# Patient Record
Sex: FEMALE | Race: BLACK OR AFRICAN AMERICAN | NOT HISPANIC OR LATINO | Employment: FULL TIME | URBAN - METROPOLITAN AREA
[De-identification: names, ages, dates, MRNs, and addresses within clinical notes are randomized per-mention and may not be internally consistent; named-entity substitution may affect disease eponyms.]

---

## 2023-10-05 ENCOUNTER — OFFICE VISIT (OUTPATIENT)
Dept: DERMATOLOGY | Facility: CLINIC | Age: 23
End: 2023-10-05
Payer: COMMERCIAL

## 2023-10-05 DIAGNOSIS — L81.9 DYSCHROMIA: ICD-10-CM

## 2023-10-05 DIAGNOSIS — L70.0 ACNE VULGARIS: Primary | ICD-10-CM

## 2023-10-05 PROCEDURE — 1159F MED LIST DOCD IN RCRD: CPT | Mod: CPTII,S$GLB,, | Performed by: DERMATOLOGY

## 2023-10-05 PROCEDURE — 1160F RVW MEDS BY RX/DR IN RCRD: CPT | Mod: CPTII,S$GLB,, | Performed by: DERMATOLOGY

## 2023-10-05 PROCEDURE — 1159F PR MEDICATION LIST DOCUMENTED IN MEDICAL RECORD: ICD-10-PCS | Mod: CPTII,S$GLB,, | Performed by: DERMATOLOGY

## 2023-10-05 PROCEDURE — 99999 PR PBB SHADOW E&M-NEW PATIENT-LVL II: ICD-10-PCS | Mod: PBBFAC,,, | Performed by: DERMATOLOGY

## 2023-10-05 PROCEDURE — 99999 PR PBB SHADOW E&M-NEW PATIENT-LVL II: CPT | Mod: PBBFAC,,, | Performed by: DERMATOLOGY

## 2023-10-05 PROCEDURE — 99204 PR OFFICE/OUTPT VISIT, NEW, LEVL IV, 45-59 MIN: ICD-10-PCS | Mod: S$GLB,,, | Performed by: DERMATOLOGY

## 2023-10-05 PROCEDURE — 1160F PR REVIEW ALL MEDS BY PRESCRIBER/CLIN PHARMACIST DOCUMENTED: ICD-10-PCS | Mod: CPTII,S$GLB,, | Performed by: DERMATOLOGY

## 2023-10-05 PROCEDURE — 99204 OFFICE O/P NEW MOD 45 MIN: CPT | Mod: S$GLB,,, | Performed by: DERMATOLOGY

## 2023-10-05 RX ORDER — ADAPALENE AND BENZOYL PEROXIDE GEL, 0.1%/2.5% 1; 25 MG/G; MG/G
GEL TOPICAL
Qty: 45 G | Refills: 3 | Status: SHIPPED | OUTPATIENT
Start: 2023-10-05

## 2023-10-05 RX ORDER — HYDROQUINONE 40 MG/G
CREAM TOPICAL
Qty: 28 G | Refills: 1 | Status: SHIPPED | OUTPATIENT
Start: 2023-10-05

## 2023-10-05 NOTE — PROGRESS NOTES
Subjective:      Patient ID:  Rafa Adames is a 22 y.o. female who presents for   Chief Complaint   Patient presents with    Skin Check     Forehead cheeks     History of Present Illness: The patient presents with chief complaint of acne, discoloration.  Location: face, back  Duration: 2 years since pregnancy and post-partum  Signs/Symptoms: discoloration    Prior treatments: beauty counter collection, noxzema (on her back), pan-oxyl, chemical peel at [facility] (triggered acne, denies improvement in discoloration)          Review of Systems   Constitutional:  Negative for fever and chills.   Gastrointestinal:  Negative for nausea and vomiting.   Skin:  Positive for activity-related sunscreen use. Negative for daily sunscreen use and recent sunburn.   Hematologic/Lymphatic: Does not bruise/bleed easily.       Objective:   Physical Exam   Constitutional: She appears well-developed and well-nourished. No distress.   Neurological: She is alert and oriented to person, place, and time. She is not disoriented.   Psychiatric: She has a normal mood and affect.   Skin:   Areas Examined (abnormalities noted in diagram):   Head / Face Inspection Performed  Neck Inspection Performed  RUE Inspected  LUE Inspection Performed  Nails and Digits Inspection Performed            Diagram Legend     Open and closed comedones      Inflammatory papules and pustules     Assessment / Plan:        Acne vulgaris  Dyschromia  -     adapalene-benzoyl peroxide (EPIDUO) 0.1-2.5 % GlwP; Apply pea-sized amount to entire face at bedtime.  Use every third night and increase as tolerated to nightly.  Dispense: 45 g; Refill: 3  -     hydroquinone 4 % Crea; Apply to dark spots once daily. Use with sunscreen if outdoors  Dispense: 28 g; Refill: 1  -     recommend above meds with HQ 8% c/ daily sunscreen. Discussed CeraVe foaming wash and hydrating sunscreen. Consider winlevi given may be hormonal if no improvement at f/u.               Follow up in about 3 months (around 1/5/2024).

## 2023-10-05 NOTE — LETTER
October 5, 2023      [facility] 4th Floor  [address] [facility] [address] [place]  [place] [place] [place] [address]  Phone: [phone]  Fax: [phone]       Patient: Rfaa Adames   YOB: 2000  Date of Visit: 10/05/2023    To Whom It May Concern:    Nelida Adames  was at [facility] on 10/05/2023. The patient may return to work on 10/05/2023 with no restrictions. If you have any questions or concerns, or if I can be of further assistance, please do not hesitate to contact me.    Sincerely,    Charmaine Pyle RN

## 2023-10-05 NOTE — PATIENT INSTRUCTIONS
"ACNE INSTRUCTIONS  Use hydroquinone bleaching cream to dark spots only each morning.  Use with a sunscreen with SPF 30.    Use epiduo gel (pea-sized amount) to entire face at bedtime. May cause irritation, start using every third night and gradually increase to every night.  You may also apply a non-comedogenic moisturizer prior to applying the epiduo to help reduce the risk of irritation and dryness.  Use a gentle cleanser twice daily such as CeraVe, Cetaphil, or Elta MD Gentle Foaming Cleanser.  Use CeraVe or Cetaphil lotion or Elta MD AM/PM therapy and use twice a day if dryness occurs.  All skin care products and cosmetics should have the label "non-comdeogenic" which means it will not clog your pores.      "